# Patient Record
Sex: FEMALE | Race: WHITE | NOT HISPANIC OR LATINO
[De-identification: names, ages, dates, MRNs, and addresses within clinical notes are randomized per-mention and may not be internally consistent; named-entity substitution may affect disease eponyms.]

---

## 2020-07-14 ENCOUNTER — TRANSCRIPTION ENCOUNTER (OUTPATIENT)
Age: 40
End: 2020-07-14

## 2020-12-12 ENCOUNTER — OUTPATIENT (OUTPATIENT)
Dept: OUTPATIENT SERVICES | Facility: HOSPITAL | Age: 40
LOS: 1 days | End: 2020-12-12
Payer: COMMERCIAL

## 2020-12-12 DIAGNOSIS — Z11.59 ENCOUNTER FOR SCREENING FOR OTHER VIRAL DISEASES: ICD-10-CM

## 2020-12-12 LAB — SARS-COV-2 RNA SPEC QL NAA+PROBE: SIGNIFICANT CHANGE UP

## 2020-12-12 PROCEDURE — U0003: CPT

## 2020-12-13 DIAGNOSIS — Z11.59 ENCOUNTER FOR SCREENING FOR OTHER VIRAL DISEASES: ICD-10-CM

## 2023-02-07 DIAGNOSIS — B37.9 CANDIDIASIS, UNSPECIFIED: ICD-10-CM

## 2023-02-07 RX ORDER — FLUCONAZOLE 150 MG/1
150 TABLET ORAL
Qty: 1 | Refills: 0 | Status: ACTIVE | COMMUNITY
Start: 2023-02-07

## 2023-03-01 ENCOUNTER — NON-APPOINTMENT (OUTPATIENT)
Age: 43
End: 2023-03-01

## 2023-03-01 ENCOUNTER — APPOINTMENT (OUTPATIENT)
Dept: ORTHOPEDIC SURGERY | Facility: CLINIC | Age: 43
End: 2023-03-01
Payer: COMMERCIAL

## 2023-03-01 DIAGNOSIS — M23.91 UNSPECIFIED INTERNAL DERANGEMENT OF RIGHT KNEE: ICD-10-CM

## 2023-03-01 DIAGNOSIS — M25.561 PAIN IN RIGHT KNEE: ICD-10-CM

## 2023-03-01 DIAGNOSIS — M79.672 PAIN IN LEFT FOOT: ICD-10-CM

## 2023-03-01 PROCEDURE — 73610 X-RAY EXAM OF ANKLE: CPT | Mod: LT

## 2023-03-01 PROCEDURE — 73562 X-RAY EXAM OF KNEE 3: CPT | Mod: RT

## 2023-03-01 PROCEDURE — 73630 X-RAY EXAM OF FOOT: CPT | Mod: LT

## 2023-03-01 PROCEDURE — 99204 OFFICE O/P NEW MOD 45 MIN: CPT

## 2023-03-01 NOTE — DISCUSSION/SUMMARY
[de-identified] : Today I had a lengthy discussion with the patient regarding their residual left ankle pain s/p Achilles tendon repair, granuloma excision.I have addressed all the patient's concerns surrounding the pathology of their condition. XR imaging was completed in office today and the results were reviewed with the patient. At this time I would like to obtain advanced imaging of the patient's left ankle. An MRI was ordered so I can find out more about the etiology of the patient's condition. The patient should follow up with the office after obtaining the MRI. I recommend the patient undergo a course of physical therapy for the left ankle 2-3 times a week for a total of 6-8 weeks. A prescription was given for the physical therapy today. \par \par The patient understood and verbally agreed to the treatment plan. All of their questions were answered and they were satisfied with the visit. The patient should call the office if they have any questions or experience worsening symptoms.

## 2023-03-01 NOTE — PHYSICAL EXAM
[de-identified] : General: Alert and oriented x3. In no acute distress. Pleasant in nature with a normal affect. No apparent respiratory distress.\par \par Left Knee Exam\par Skin: Clean, dry, intact\par Inspection: No obvious malalignment, no masses, no swelling, no effusion \par Pulses: 2+ DP/PT pulses\par ROM: 0-130 degrees of flexion. No pain with deep knee flexion.\par Tenderness: No MJLT. No LJLT. No pain over the patella facets. No pain to the quadriceps mechanism.\par Stability: Stable to varus, valgus, Lachman testing. Negative anterior/posterior drawer.\par Strength: 5/5 Q/H/TA/GS/EHL, no atrophy\par Neuro: In tact to light touch throughout, DTR's normal\par Additional tests: Negative McMurrays test, Negative patellar grind test. \par \par Left Foot Exam\par Skin: Clean, dry, intact, 4 inch posterior incision with areas of fullness and thickening to the posterior Achilles. \par Inspection: No obvious malalignment, no masses, \par Pulses: 2+ DP/PT pulses\par ROM: FOOT Full ROM of digits, ANKLE 10 degrees of dorsiflexion, 40 degrees of plantarflexion, 35 degress of inversion, 35 degrees of eversion,10 degrees of subtalar motion.\par Painful ROM: None\par Tenderness: Tenderness over the repair site. No tenderness over the medial malleolus, No tenderness over the lateral malleolus, no CFL/ATFL/PTFL pain, no deltoid ligament pain. No heel pain. No Achilles tenderness. No 5th metatarsal pain. No pain to the LisFranc joint. No ttp over the posterior tibial tendon.\par Stability: Negative anterior/posterior drawer.\par Strength: 5/5 ADD/ABD/TA/GS/EHL/FHL/EDL\par Neuro: Sensation in tact to light touch throughout\par Additional tests: Negative Mortons test, negative tarsal tunnel tinels, negative single heel rise, Rosario's negative.  [de-identified] : 5V of the left ankle were ordered obtained and reviewed by me today, 03/01/2023 , revealed: stable XRs\par \par 3V of the right knee were ordered obtained and reviewed by me today, 03/01/2023 , revealed: normal alignment, normal appearing joint spaces.\par \par

## 2023-03-01 NOTE — HISTORY OF PRESENT ILLNESS
[FreeTextEntry1] : The patient is a 43 yo female presenting for an initial visit of left foot/ankle pain. She is here seeking a second opinion. The patient states that she recently has Achilles tendon repair June 2022 by Dr. Broussard. She states a few weeks after she developed cellulitis and had a granuloma excision. She states she started physical therapy around the 6 week post op. The patient endorse posterior ankle tightness and and dorsal foot pain. The patient is currently in PT for this issue. She states that she has been compensating her gait patter to which her right knee is now bothering her. She denies numbness and tingling. She presents wearing boots and is ambulating without assistance. No other complaints. Had IUD prior to the injury. NO antibiotic

## 2023-03-16 ENCOUNTER — OUTPATIENT (OUTPATIENT)
Dept: OUTPATIENT SERVICES | Facility: HOSPITAL | Age: 43
LOS: 1 days | End: 2023-03-16
Payer: COMMERCIAL

## 2023-03-16 ENCOUNTER — APPOINTMENT (OUTPATIENT)
Dept: MRI IMAGING | Facility: CLINIC | Age: 43
End: 2023-03-16
Payer: COMMERCIAL

## 2023-03-16 DIAGNOSIS — Z87.828 PERSONAL HISTORY OF OTHER (HEALED) PHYSICAL INJURY AND TRAUMA: ICD-10-CM

## 2023-03-16 DIAGNOSIS — M25.572 PAIN IN LEFT ANKLE AND JOINTS OF LEFT FOOT: ICD-10-CM

## 2023-03-16 DIAGNOSIS — M79.672 PAIN IN LEFT FOOT: ICD-10-CM

## 2023-03-16 DIAGNOSIS — Z98.890 OTHER SPECIFIED POSTPROCEDURAL STATES: ICD-10-CM

## 2023-03-16 PROCEDURE — 73721 MRI JNT OF LWR EXTRE W/O DYE: CPT

## 2023-03-16 PROCEDURE — 73721 MRI JNT OF LWR EXTRE W/O DYE: CPT | Mod: 26,LT

## 2023-03-17 ENCOUNTER — TRANSCRIPTION ENCOUNTER (OUTPATIENT)
Age: 43
End: 2023-03-17

## 2023-03-22 ENCOUNTER — APPOINTMENT (OUTPATIENT)
Dept: OBGYN | Facility: CLINIC | Age: 43
End: 2023-03-22
Payer: COMMERCIAL

## 2023-03-22 ENCOUNTER — RESULT CHARGE (OUTPATIENT)
Age: 43
End: 2023-03-22

## 2023-03-22 VITALS
WEIGHT: 144 LBS | BODY MASS INDEX: 33.33 KG/M2 | DIASTOLIC BLOOD PRESSURE: 80 MMHG | HEIGHT: 55 IN | SYSTOLIC BLOOD PRESSURE: 124 MMHG | HEART RATE: 85 BPM

## 2023-03-22 DIAGNOSIS — Z97.5 PRESENCE OF (INTRAUTERINE) CONTRACEPTIVE DEVICE: ICD-10-CM

## 2023-03-22 LAB
BILIRUB UR QL STRIP: NORMAL
CLARITY UR: CLEAR
COLLECTION METHOD: NORMAL
GLUCOSE UR-MCNC: NORMAL
HCG UR QL: 0.2 EU/DL
HEMOGLOBIN: 12.6
HGB UR QL STRIP.AUTO: NORMAL
KETONES UR-MCNC: NORMAL
LEUKOCYTE ESTERASE UR QL STRIP: NORMAL
NITRITE UR QL STRIP: NORMAL
PH UR STRIP: 7
PROT UR STRIP-MCNC: NORMAL
SP GR UR STRIP: 1.01

## 2023-03-22 PROCEDURE — 82270 OCCULT BLOOD FECES: CPT

## 2023-03-22 PROCEDURE — 99396 PREV VISIT EST AGE 40-64: CPT

## 2023-03-22 PROCEDURE — 85018 HEMOGLOBIN: CPT | Mod: QW

## 2023-03-22 PROCEDURE — 81003 URINALYSIS AUTO W/O SCOPE: CPT | Mod: QW

## 2023-03-22 PROCEDURE — 99386 PREV VISIT NEW AGE 40-64: CPT

## 2023-03-23 NOTE — HISTORY OF PRESENT ILLNESS
[TextBox_4] : Patient is a 41 yo female here today for annual visit. She had a mirena IUD inserted 1/2022. She is tolerating well. Periods are very light\par she has hx of rectal bleeding from an internal hemorrhoid in the past and had a colonoscopy years ago.\par grandmother hx of esophageal cancer her PMD recommended another colonoscopy at this time \par she recently ruptured her Achilles and had surgery

## 2023-03-23 NOTE — PLAN
Frequent urination [FreeTextEntry1] : \par \par Patient to follow up in 1 year for annual GYN exam\par Mammogram and bilateral breast US due: now Rx given \par Colonoscopy due: now \par Bone density due:   pm \par \par \par \par Pap ordered\par Hemoccult ordered \par All questions answered, patient agreeable with plan.\par \par \par \par I Jolene Luevano FNLUCINDA-C am scribing for the presence of Dr. Lockwood the following sections HISTORY OF PRESENT ILLNESS, PAST MEDICAL/FAMILY/SOCIAL HISTORY; REVIEW OF SYSTEMS; VITAL SIGNS; PHYSICAL EXAM; DISPOSITION. \par I personally performed the services described in the documentation, reviewed the documentation recorded by the scribe in my presence and it accurately and completely records my words and actions.\par

## 2023-03-23 NOTE — PHYSICAL EXAM
[Chaperone Present] : A chaperone was present in the examining room during all aspects of the physical examination [Appropriately responsive] : appropriately responsive [Alert] : alert [No Acute Distress] : no acute distress [No Lymphadenopathy] : no lymphadenopathy [Soft] : soft [Non-tender] : non-tender [Non-distended] : non-distended [No HSM] : No HSM [No Lesions] : no lesions [No Mass] : no mass [Oriented x3] : oriented x3 [Examination Of The Breasts] : a normal appearance [No Discharge] : no discharge [No Masses] : no breast masses were palpable [Labia Majora] : normal [Labia Minora] : normal [IUD String] : an IUD string was noted [Normal] : normal [Uterine Adnexae] : normal [Normal rectal exam] : was normal [FreeTextEntry1] : Jolene PETTY-MAHIN chaperoned during entire physical exam [Occult Blood Positive] : was negative for occult blood analysis

## 2023-03-24 ENCOUNTER — APPOINTMENT (OUTPATIENT)
Dept: ORTHOPEDIC SURGERY | Facility: CLINIC | Age: 43
End: 2023-03-24
Payer: COMMERCIAL

## 2023-03-24 PROCEDURE — 99214 OFFICE O/P EST MOD 30 MIN: CPT

## 2023-03-24 RX ORDER — MELOXICAM 15 MG/1
15 TABLET ORAL
Qty: 14 | Refills: 1 | Status: ACTIVE | COMMUNITY
Start: 2023-03-24 | End: 1900-01-01

## 2023-03-24 RX ORDER — DICLOFENAC SODIUM 1% 10 MG/G
1 GEL TOPICAL
Qty: 100 | Refills: 2 | Status: ACTIVE | COMMUNITY
Start: 2023-03-24 | End: 1900-01-01

## 2023-03-24 NOTE — ADDENDUM
[FreeTextEntry1] : I, LATRICE HOLLEY, acted solely as a scribe for Dr. Jhonny Tamayo on this date 03/24/2023  .\par  \par All medical record entries made by the Scribe were at my, Dr. Jhonny Tamayo, direction and personally dictated by me on 03/24/2023 . I have reviewed the chart and agree that the record accurately reflects my personal performance of the history, physical exam, assessment and plan. I have also personally directed, reviewed, and agreed with the chart.

## 2023-03-24 NOTE — DISCUSSION/SUMMARY
[de-identified] : Today I had a lengthy discussion with the patient regarding their residual left ankle pain s/p Achilles tendon repair, granuloma excision. I have addressed all the patient's concerns surrounding the pathology of their condition.  MRI imaging was reviewed in detail with patient. Discussed with the patient conservative and surgical treatment options. Discussed with the patient that we will continue with conservative treatment. I recommend the patient to continue with physical therapy for the left ankle  2-3 times a week for a total of 6-8 weeks. I recommend that the patient utilize Voltaren gel topically.  A prescription for the Voltaren gel was ordered for the patient today. If the Voltaren gel could not be obtained, Icy Hot, Biofreeze, or Bengay can be utilized instead. I recommend that the patient utilize meloxicam with food once per day as instructed. A prescription for the meloxicam was ordered for the patient in the office today. I recommend that the patient utilize ice and heat. They can also elevate their left ankle above the level of the heart. \par \par Patient advised to follow up for further evaluation in 6 - 8 weeks.\par \par The patient understood and verbally agreed to the treatment plan. All of their questions were answered and they were satisfied with the visit. \par The patient should call the office if they have any questions or experience worsening symptoms.\par

## 2023-03-24 NOTE — HISTORY OF PRESENT ILLNESS
[FreeTextEntry1] : 3/24/23: The patient is here for a follow-up of her left ankle and to review the MRI of her left ankle.  No change since the previous office visit. Patient presents in sneakers. Patient states she is currently undergoing Physical therapy for left ankle.\par \par 3/1/23: The patient is a 41 yo female presenting for an initial visit of left foot/ankle pain. She is here seeking a second opinion. The patient states that she recently has Achilles tendon repair June 2022 by Dr. Broussard. She states a few weeks after she developed cellulitis and had a granuloma excision. She states she started physical therapy around the 6 week post op. The patient endorse posterior ankle tightness and and dorsal foot pain. The patient is currently in PT for this issue. She states that she has been compensating her gait patter to which her right knee is now bothering her. She denies numbness and tingling. She presents wearing boots and is ambulating without assistance. No other complaints. Had IUD prior to the injury. NO antibiotic

## 2023-03-24 NOTE — PHYSICAL EXAM
[de-identified] : General: Alert and oriented x3. In no acute distress. Pleasant in nature with a normal affect. No apparent respiratory distress.\par \par Left Knee Exam\par Skin: Clean, dry, intact\par Inspection: No obvious malalignment, no masses, no swelling, no effusion \par Pulses: 2+ DP/PT pulses\par ROM: 0-130 degrees of flexion. No pain with deep knee flexion.\par Tenderness: No MJLT. No LJLT. No pain over the patella facets. No pain to the quadriceps mechanism.\par Stability: Stable to varus, valgus, Lachman testing. Negative anterior/posterior drawer.\par Strength: 5/5 Q/H/TA/GS/EHL, no atrophy\par Neuro: In tact to light touch throughout, DTR's normal\par Additional tests: Negative McMurrays test, Negative patellar grind test. \par \par Left Foot Exam\par Skin: Clean, dry, intact, 4 inch posterior incision with areas of fullness and thickening to the posterior Achilles. \par Inspection: No obvious malalignment, no masses, \par Pulses: 2+ DP/PT pulses\par ROM: FOOT Full ROM of digits, ANKLE 10 degrees of dorsiflexion, 40 degrees of plantarflexion, 35 degress of inversion, 35 degrees of eversion,10 degrees of subtalar motion.\par Painful ROM: None\par Tenderness: Tenderness over the repair site. No tenderness over the medial malleolus, No tenderness over the lateral malleolus, no CFL/ATFL/PTFL pain, no deltoid ligament pain. No heel pain. No Achilles tenderness. No 5th metatarsal pain. No pain to the LisFranc joint. No ttp over the posterior tibial tendon.\par Stability: Negative anterior/posterior drawer.\par Strength: 5/5 ADD/ABD/TA/GS/EHL/FHL/EDL\par Neuro: Sensation in tact to light touch throughout\par Additional tests: Negative Mortons test, negative tarsal tunnel tinels, negative single heel rise, Rosario's negative.  [de-identified] : EXAM: [id number] - MR ANKLE LT  - ORDERED BY:  OLVIN REYES\par \par \par PROCEDURE DATE:  03/16/2023\par \par \par \par INTERPRETATION:  LEFT ANKLE MRI\par \par CLINICAL INDICATION: Persistent ankle pain, status post Achilles tendon repair surgery in June 2022.\par \par COMPARISON: None available.\par \par TECHNIQUE: Multiplanar, multisequence MRI was obtained of the left ankle.\par \par FINDINGS:\par \par LIGAMENTS: Small chronic grade 2 sprain and healed minimally displaced cortical avulsion stress fracture at the fibular origin of the anterior talofibular ligament, but with intact fibers throughout. Calcaneofibular ligament is intact. . Grade 1 subacute on chronic sprain and fraying of the posterior inferior tibiofibular ligament without discrete tear. Chronic grade 2 sprains of the inferior tibiofibular ligaments without syndesmotic widening. Small grade 2 subacute on grade 2 chronic sprain of the deltoid ligamentous complex including more recent partial-thickness tear of the deep posterior tibiotalar band. 1 subacute on chronic sprain of the calcaneonavicular spring ligament, but with intact fibers throughout. Small grade 2 sprains of the bifurcated and short calcaneocuboid ligaments.\par \par DEEP POSTERIOR COMPARTMENT: Small subacute grade 2 sprain of the deep/lateral slip of the posterior tibialis tendon at the level of the accessory navicular (series 8, image 16; series 4, image 27). Grade 1 subacute on grade 2 chronic strain of the superficial/medial slip of the posterior tibialis tendon at the level of the naviculocuneiform joint (series 9, image 13). Subcutaneous grade 1 strain at the myotendinous junction of the flexor hallucis longus. Intact flexor digitorum longus.\par \par ACHILLES TENDON: Status post remote repair surgery with severe tendinosis and mild to moderate peritendinitis between the myotendinous junction of the soleus and avascular zone. Residual postsurgical changes versus small recurrent low-grade partial-thickness interstitial tear of the medial third the level of the avascular zone in its calcaneal insertion. Trace reactive fluid within the retrocalcaneal bursa without cesar bursitis. Subacute grade 1 strain at the myotendinous junction of the soleus.\par \par LATERAL COMPARTMENT: Peroneal tendons are intact.\par \par ANTERIOR COMPARTMENT: Tibialis anterior and extensor tendons are intact.\par \par OSSEOUS: No acute fracture, osteonecrosis, or aggressive neoplasm. No osteochondral lesion of the talar dome.\par \par SYNOVIUM: Small volume residual reactive tibiotalar and hindfoot effusions.\par \par GENERAL: No significant acute plantar fasciitis. No significant loss of normal fat signal within the sinus tarsus. Normal appearance of the neurovascular bundle within the tarsal tunnel.\par \par IMPRESSION:\par \par 1.  Status post left Achilles repair surgery without high-grade residual or recurrent re-tear. Mild to moderate postsurgical tendinitis with low-grade residual versus small recurrent partial-thickness interstitial tear of the medial third between the avascular zone and calcaneal insertion.\par 2.  Grade 2 subacute on grade 2 chronic multi ligamentous sprain injury of left ankle and hindfoot including small more recent partial-thickness tears of the deltoid, bifurcate, and short plantar calcaneocuboid ligaments. Small residual reactive tibiotalar and hindfoot effusions. No recent fracture.\par 3.  Grade 2 subacute on grade 2 chronic strain of left posterior tibialis tendon including small more recent partial-thickness tear of the deep/lateral slip at the level of the accessory navicular. Grade 1 subacute on grade 2 chronic sprain of adjacent calcaneonavicular spring ligament.\par \par --- End of Report ---\par \par \par \par \par \par \par ORQUIDEA YAN MD; Attending Radiologist\par This document has been electronically signed. Mar 22 2023  9:03AM\par \par  \par \par  Ordered by: OLVIN REYES       Collected/Examined: 16Mar2023 12:51PM       \par Verified by: OLVIN REYES 22Mar2023 12:31PM       \par  Result Communication: No patient communication needed at this time;\par Stage: Final       \par  Performed at: [facility]       Resulted: 22Mar2023 08:47AM       Last Updated: 22Mar2023 12:31PM       Accession: [id number]

## 2023-03-28 LAB — CYTOLOGY CVX/VAG DOC THIN PREP: NORMAL

## 2023-04-25 ENCOUNTER — APPOINTMENT (OUTPATIENT)
Dept: ORTHOPEDIC SURGERY | Facility: CLINIC | Age: 43
End: 2023-04-25
Payer: COMMERCIAL

## 2023-04-25 PROCEDURE — 99214 OFFICE O/P EST MOD 30 MIN: CPT

## 2023-04-25 PROCEDURE — 73564 X-RAY EXAM KNEE 4 OR MORE: CPT | Mod: RT

## 2023-04-25 NOTE — PHYSICAL EXAM
[de-identified] :  Multi-System Physical Exam\par \par General: Well appearing, no acute distress \par Neurologic: A&Ox3, No focal deficits \par Head: NCAT without abrasions, lacerations, or ecchymosis to head, face, or scalp \par Eyes: No scleral icterus, no gross abnormalities \par Respiratory: Equal chest wall expansion bilaterally, no accessory muscle use \par Lymphatic: No lymphadenopathy palpated \par Skin: Warm and dry \par Psychiatric: Normal mood and affect\par \par Right Knee: \par Range of Motion in Degrees   \par Flexion Active  135-degrees \par Flexion Passive  135-degrees \par Extension Active  0-5-degrees \par Extension Passive  0-5-degrees   \par \par No weakness to flexion/extension. Crepitus noted. No evidence of instability in the AP plane or varus or valgus stress. positive Grind. Negative Lachman. Negative pivot shift. Negative anterior drawer test. Negative posterior drawer test. Negative Gretchen. Negative Apley grind. negative Cipriano test. + medial joint line tenderness at 90. No lateral jointline tenderness. POS synovial cyst to popliteal fossa. No tenderness over the medial and lateral facet of the patella. No patellofemoral crepitations. + lateral tracking patella. No patellar apprehension. No crepitation in the medial and lateral femoral condyle. No proximal or distal swelling, edema or tenderness. No gross motor or sensory deficits. No intra-articular swelling. 2+ DP and PT pulses. No varus or valgus malalignment. Skin is intact. No rashes, scars or lesions. Quad strength normal. Motor exam 5/5 distally, EHL/FHL/GSC/TA   \par \par Left Knee: \par Range of Motion in Degrees   \par Flexion Active  135-degrees \par Flexion Passive  135-degrees \par Extension Active  0-5-degrees \par Extension Passive  0-5-degrees   \par \par No weakness to flexion/extension. No evidence of instability in the AP plane or varus or valgus stress. Negative Lachman. Negative pivot shift. Negative anterior drawer test. Negative posterior drawer test. Negative Gretchen. Negative Apley grind. negative Cipriano test. No medial or lateral joint line tenderness. No tenderness over the medial and lateral facet of the patella. No patellofemoral crepitations. No lateral tilting patella. No patellar apprehension. No crepitation in the medial and lateral femoral condyle. No proximal or distal swelling, edema or tenderness. No gross motor or sensory deficits. No intra-articular swelling. 2+ DP and PT pulses. No varus or valgus malalignment. Skin is intact. No rashes, scars or lesions. Quad strength normal. Motor exam 5/5 distally, EHL/FHL/GSC/TA  [de-identified] : 4 views of R knee were performed today and available for me to review. Results were discussed with the patient. They demonstrate no f/x, dislocation or other deformity.\par

## 2023-04-25 NOTE — HISTORY OF PRESENT ILLNESS
[Worsening] : worsening [___ mths] : [unfilled] month(s) ago [3] : an average pain level of 3/10 [2] : a minimum pain level of 2/10 [4] : a maximum pain level of 4/10 [Bending] : worsened by bending [Walking] : worsened by walking [de-identified] : DAYLIN RAPP is a 42 year female being seen for initial visit R knee pain. She has a prior h/x of a L achilles tendon repair in June 2022 by Dr. Broussard with some wound complications. She reports she has been experiencing right knee pain since recovering from that surgery. She endorses pain over the patellofemoral joint. She describes the pain as achy and dull. She reports most pain with stairs, long periods of bending and when participating in exercise classes. She denies swelling of her knee. Patient denies mechanical symptoms such as clicking and popping, as well as instances of instability. Patient denies numbness and tingling to the extremities. She reports no prior injuries to her R knee. She has been using ice with relief.  She has been performing PT for the last couple of months without improvement of her knee pain. She was referred by Dr. Tamayo.

## 2023-04-25 NOTE — ADDENDUM
[FreeTextEntry1] : Documented by Oanh Flanagan acting as a scribe for Dr. Diaz and Armando Reed PA-C on 04/25/2023.   All medical record entries made by the Scribe were at my, Dr. Diaz, and Armando Reed's, direction and personally dictated by me on 04/25/2023. I have reviewed the chart and agree that the record accurately reflects my personal performance of the history, physical exam, procedure and imaging.

## 2023-04-25 NOTE — DISCUSSION/SUMMARY
[de-identified] : We had a thorough discussion regarding the nature of her pain, the pathophysiology, as well as all treatment options. 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 Patient was given prescription of formal physical therapy that she will perform 2x/wk for 6-8 wks. Considering the patient's current presentation of pain, physical exam, and radiographs an MRI is indicated at this time. A prescription for this was given to the patient. We will go over the MRI results with her upon obtaining the results in the office and advise her of further treatment options. She agrees with the above plan and all questions were answered.

## 2023-05-12 ENCOUNTER — APPOINTMENT (OUTPATIENT)
Dept: MRI IMAGING | Facility: CLINIC | Age: 43
End: 2023-05-12
Payer: COMMERCIAL

## 2023-05-12 ENCOUNTER — OUTPATIENT (OUTPATIENT)
Dept: OUTPATIENT SERVICES | Facility: HOSPITAL | Age: 43
LOS: 1 days | End: 2023-05-12
Payer: COMMERCIAL

## 2023-05-12 DIAGNOSIS — M22.41 CHONDROMALACIA PATELLAE, RIGHT KNEE: ICD-10-CM

## 2023-05-12 PROCEDURE — 73721 MRI JNT OF LWR EXTRE W/O DYE: CPT | Mod: 26,RT

## 2023-05-12 PROCEDURE — 73721 MRI JNT OF LWR EXTRE W/O DYE: CPT

## 2023-05-30 ENCOUNTER — APPOINTMENT (OUTPATIENT)
Dept: ORTHOPEDIC SURGERY | Facility: CLINIC | Age: 43
End: 2023-05-30
Payer: COMMERCIAL

## 2023-05-30 PROCEDURE — 99442: CPT

## 2023-07-20 ENCOUNTER — APPOINTMENT (OUTPATIENT)
Dept: ORTHOPEDIC SURGERY | Facility: CLINIC | Age: 43
End: 2023-07-20
Payer: COMMERCIAL

## 2023-07-20 PROCEDURE — 20605 DRAIN/INJ JOINT/BURSA W/O US: CPT | Mod: LT

## 2023-07-20 PROCEDURE — 99213 OFFICE O/P EST LOW 20 MIN: CPT | Mod: 25

## 2023-07-20 NOTE — HISTORY OF PRESENT ILLNESS
[FreeTextEntry1] : 7/20/23:  The patient is here for follow-up of her left ankle. The patient is having central ankle pain.  Patient also having Achilles tendon pain but she is seeing a dermatologist who was injecting her Achilles tendon with cortisone injections.  Patient is here for her central ankle pain.  No other complaints.  No trauma associated with her pain.  Patient remains active.\par \par 3/24/23: The patient is here for a follow-up of her left ankle and to review the MRI of her left ankle.  No change since the previous office visit. Patient presents in [facility]. Patient states she is currently undergoing Physical therapy for left ankle.\par \par 3/1/23: The patient is a 43 yo female presenting for an initial visit of left foot/ankle pain. She is here seeking a second opinion. The patient states that she recently has Achilles tendon repair June 2022 by Dr. Broussard. She states a few weeks after she developed cellulitis and had a granuloma excision. She states she started physical therapy around the 6 week post op. The patient endorse posterior ankle tightness and and dorsal foot pain. The patient is currently in PT for this issue. She states that she has been compensating her gait patter to which her right knee is now bothering her. She denies numbness and tingling. She presents wearing boots and is ambulating without assistance. No other complaints. Had IUD prior to the injury. NO antibiotic

## 2023-07-20 NOTE — PROCEDURE
[FreeTextEntry1] : Laterality: Left ankle Tibiotalar Joint Injection (cortisone)\par \par The risks and benefits of the injection were reviewed with the patient today in office and all their questions were answered.  The injection site was the left ankle Tibiotalar Joint.  Prior to giving the injection the injection site was prepped with Chloraprep and a sterile field was created.  Sterile technique was carried out throughout the procedure.  After verbal consent from the patient we went ahead and injected the left ankle with 2 ml 40 mg Depo-Medrol, 3 ml 1% lidocaine and 2 ml of .50% Bupivacaine for a total of 6 ml with a 25 tye 1.5" needle.  The medication was placed into the joint without complication.  Post injection the patient reported no pain, had a normal gait and good motion of the ankle.  The patient denied numbness and tingling in their foot.  There were no complications during the procedure.

## 2023-07-20 NOTE — PHYSICAL EXAM
[de-identified] : Left ankle Physical Examination:\par \par General: Alert and oriented x3.  In no acute distress.  Pleasant in nature with a normal affect.  No apparent respiratory distress. \par Erythema, Warmth, Rubor: Negative\par Swelling: Negative\par \par ROM:\par 1. Dorsiflexion: 10 degrees\par 2. Plantarflexion: 40 degrees\par 3. Inversion: 30 degrees\par 4. Eversion: 20 degrees\par \par Tenderness to Palpation: \par 1. Lateral Malleolus: Negative\par 2. Medial Malleolus: Negative\par 3. Proximal Fibular Pain: Negative\par 4. Heel Pain: Negative\par 5. Cuboid: Negative\par 6. Navicular: Negative\par 7. Tibiotalar Joint: Positive pain central ankle tibiotalar joint.\par 8. Subtalar Joint: Negative\par 9. Posterior Recess: Negative\par \par Tendon Pain:\par 1. Achilles: Positive pain and tenderness to palpation of the Achilles tendon with thickening noted.\par 2. Peroneals: Negative\par 3. Posterior Tibialis: Negative\par 4. Tibialis Anterior: Negative\par \par Ligament Pain:\par 1. ATFL: Negative\par 2. CFL: Negative \par 3. PTFL: Negative\par 4. Deltoid Ligaments: Negative\par 5. Lis Franc Ligament: Negative\par \par Stability: \par 1. Anterior Drawer: Negative\par 2. Posterior Drawer: Negative\par \par Strength: 5/5 TA/GS/EHL\par \par Pulses: 2+ DP/PT Pulses\par \par Neuro: Intact motor and sensory\par \par Additional Test:\par 1. Calcaneal Squeeze Test: Negative\par 2. Syndesmosis Squeeze Test: Negative [de-identified] : EXAM: [id number] - MR ANKLE LT  - ORDERED BY:  OLVIN REYES\par \par \par PROCEDURE DATE:  03/16/2023\par \par \par \par INTERPRETATION:  LEFT ANKLE MRI\par \par CLINICAL INDICATION: Persistent ankle pain, status post Achilles tendon repair surgery in June 2022.\par \par COMPARISON: None available.\par \par TECHNIQUE: Multiplanar, multisequence MRI was obtained of the left ankle.\par \par FINDINGS:\par \par LIGAMENTS: Small chronic grade 2 sprain and healed minimally displaced cortical avulsion stress fracture at the fibular origin of the anterior talofibular ligament, but with intact fibers throughout. Calcaneofibular ligament is intact. . Grade 1 subacute on chronic sprain and fraying of the posterior inferior tibiofibular ligament without discrete tear. Chronic grade 2 sprains of the inferior tibiofibular ligaments without syndesmotic widening. Small grade 2 subacute on grade 2 chronic sprain of the deltoid ligamentous complex including more recent partial-thickness tear of the deep posterior tibiotalar band. 1 subacute on chronic sprain of the calcaneonavicular spring ligament, but with intact fibers throughout. Small grade 2 sprains of the bifurcated and short calcaneocuboid ligaments.\par \par DEEP POSTERIOR COMPARTMENT: Small subacute grade 2 sprain of the deep/lateral slip of the posterior tibialis tendon at the level of the accessory navicular (series 8, image 16; series 4, image 27). Grade 1 subacute on grade 2 chronic strain of the superficial/medial slip of the posterior tibialis tendon at the level of the naviculocuneiform joint (series 9, image 13). Subcutaneous grade 1 strain at the myotendinous junction of the flexor hallucis longus. Intact flexor digitorum longus.\par \par ACHILLES TENDON: Status post remote repair surgery with severe tendinosis and mild to moderate peritendinitis between the myotendinous junction of the soleus and avascular zone. Residual postsurgical changes versus small recurrent low-grade partial-thickness interstitial tear of the medial third the level of the avascular zone in its calcaneal insertion. Trace reactive fluid within the retrocalcaneal bursa without cesar bursitis. Subacute grade 1 strain at the myotendinous junction of the soleus.\par \par LATERAL COMPARTMENT: Peroneal tendons are intact.\par \par ANTERIOR COMPARTMENT: Tibialis anterior and extensor tendons are intact.\par \par OSSEOUS: No acute fracture, osteonecrosis, or aggressive neoplasm. No osteochondral lesion of the talar dome.\par \par SYNOVIUM: Small volume residual reactive tibiotalar and hindfoot effusions.\par \par GENERAL: No significant acute plantar fasciitis. No significant loss of normal fat signal within the sinus tarsus. Normal appearance of the neurovascular bundle within the tarsal tunnel.\par \par IMPRESSION:\par \par 1.  Status post left Achilles repair surgery without high-grade residual or recurrent re-tear. Mild to moderate postsurgical tendinitis with low-grade residual versus small recurrent partial-thickness interstitial tear of the medial third between the avascular zone and calcaneal insertion.\par 2.  Grade 2 subacute on grade 2 chronic multi ligamentous sprain injury of left ankle and hindfoot including small more recent partial-thickness tears of the deltoid, bifurcate, and short plantar calcaneocuboid ligaments. Small residual reactive tibiotalar and hindfoot effusions. No recent fracture.\par 3.  Grade 2 subacute on grade 2 chronic strain of left posterior tibialis tendon including small more recent partial-thickness tear of the deep/lateral slip at the level of the accessory navicular. Grade 1 subacute on grade 2 chronic sprain of adjacent calcaneonavicular spring ligament.\par \par --- End of Report ---\par \par \par \par \par \par \par ORQUIDEA YAN MD; Attending Radiologist\par This document has been electronically signed. Mar 22 2023  9:03AM\par \par  \par \par  Ordered by: OLVIN REYES       Collected/Examined: 16Mar2023 12:51PM       \par Verified by: OLVIN REYES 22Mar2023 12:31PM       \par  Result Communication: No patient communication needed at this time;\par Stage: Final       \par  Performed at: [facility] [place]       Resulted: 22Mar2023 08:47AM       Last Updated: 22Mar2023 12:31PM       Accession: [id number]

## 2023-07-20 NOTE — DISCUSSION/SUMMARY
[de-identified] : Assessment: Left ankle pain, chronic.\par \par Plan:\par 1. RICE Therapy.\par 2. Antiinflammatories/Tylenol as needed for ankle of discomfort. \par 3. The patient was advised to rest the ankle for 24-48 hours post injection.  The patient is able to resume normal activities in 24-48 hours post injection if the patient is experiencing minimal to no pain. \par 4. Continue with a home exercise/stretching program. \par 5. All the patients questions were answered.  If the patient is experiencing any problems or has concerns they were advised to call the office or make an appointment to come in to be evaluated.  Follow-up in 6 to 8 weeks if the pain does not subside post injection.  Next office visit if pain does not subside with Dr. Tamayo.\par \par \par The patient understood and verbally agreed to the treatment plan. All of their questions were answered and they were satisfied with the visit. \par The patient should call the office if they have any questions or experience worsening symptoms.\par

## 2023-07-21 ENCOUNTER — NON-APPOINTMENT (OUTPATIENT)
Age: 43
End: 2023-07-21

## 2023-09-18 ENCOUNTER — APPOINTMENT (OUTPATIENT)
Dept: ORTHOPEDIC SURGERY | Facility: CLINIC | Age: 43
End: 2023-09-18
Payer: COMMERCIAL

## 2023-09-18 PROCEDURE — 99214 OFFICE O/P EST MOD 30 MIN: CPT

## 2023-10-02 ENCOUNTER — APPOINTMENT (OUTPATIENT)
Dept: MRI IMAGING | Facility: CLINIC | Age: 43
End: 2023-10-02

## 2024-01-11 ENCOUNTER — APPOINTMENT (OUTPATIENT)
Dept: ORTHOPEDIC SURGERY | Facility: CLINIC | Age: 44
End: 2024-01-11
Payer: COMMERCIAL

## 2024-01-11 DIAGNOSIS — M67.88 OTHER SPECIFIED DISORDERS OF SYNOVIUM AND TENDON, OTHER SITE: ICD-10-CM

## 2024-01-11 DIAGNOSIS — Z87.828 PERSONAL HISTORY OF OTHER (HEALED) PHYSICAL INJURY AND TRAUMA: ICD-10-CM

## 2024-01-11 DIAGNOSIS — S93.402A SPRAIN OF UNSPECIFIED LIGAMENT OF LEFT ANKLE, INITIAL ENCOUNTER: ICD-10-CM

## 2024-01-11 DIAGNOSIS — M76.62 ACHILLES TENDINITIS, LEFT LEG: ICD-10-CM

## 2024-01-11 DIAGNOSIS — Z98.890 OTHER SPECIFIED POSTPROCEDURAL STATES: ICD-10-CM

## 2024-01-11 DIAGNOSIS — M25.572 PAIN IN LEFT ANKLE AND JOINTS OF LEFT FOOT: ICD-10-CM

## 2024-01-11 PROCEDURE — 99214 OFFICE O/P EST MOD 30 MIN: CPT

## 2024-01-11 NOTE — ADDENDUM
[FreeTextEntry1] : I, LATRICE HOLLEY, acted solely as a scribe for Dr. Jhonny Tamayo on this date 09/18/2023  .   All medical record entries made by the Scribe were at my, Dr. Jhonny Tamayo, direction and personally dictated by me on 09/18/2023 . I have reviewed the chart and agree that the record accurately reflects my personal performance of the history, physical exam, assessment and plan. I have also personally directed, reviewed, and agreed with the chart.

## 2024-01-11 NOTE — DISCUSSION/SUMMARY
[de-identified] :  Today I had a lengthy discussion with the patient regarding their left ankle pain.I have addressed all the patient's concerns surrounding the pathology of their condition.  A lengthy discussion was had about the surgery. All risks, benefits and alternatives to the recommended surgical procedure were discussed which include but are not limited to bleeding, infection, nerve damage, vascular damage, failure of the wound to heal, the need for further surgery, loss of limb, DVT, PE, loss of life as well as the risks associated with general anesthesia. The patient verbalized understanding.  I recommended to the patient a lidocaine injection in her left ankle to assist in easing the chronic pain that she is experiencing.  I recommend that the patient utilize ice, NSAIDS PRN, and heat. They can also elevate their left ankle above the level of the heart.  The patient understood and verbally agreed to the treatment plan. All of their questions were answered and they were satisfied with the visit. The patient should call the office if they have any questions or experience worsening symptoms.

## 2024-01-11 NOTE — PHYSICAL EXAM
[Normal] : Oriented to person, place, and time, insight and judgement were intact and the affect was normal [de-identified] : Left ankle Physical Examination:  General: Alert and oriented x3.  In no acute distress.  Pleasant in nature with a normal affect.  No apparent respiratory distress.  Erythema, Warmth, Rubor: Negative Swelling: Negative  ROM: 1. Dorsiflexion: 10 degrees 2. Plantarflexion: 40 degrees 3. Inversion: 30 degrees 4. Eversion: 20 degrees  Tenderness to Palpation:  1. Lateral Malleolus: Negative 2. Medial Malleolus: Negative 3. Proximal Fibular Pain: Negative 4. Heel Pain: Negative 5. Cuboid: Negative 6. Navicular: Negative 7. Tibiotalar Joint: Positive pain central ankle tibiotalar joint. 8. Subtalar Joint: Negative 9. Posterior Recess: Negative  Tendon Pain: 1. Achilles: Positive pain and tenderness to palpation of the Achilles tendon with thickening noted. 2. Peroneals: Negative 3. Posterior Tibialis: Negative 4. Tibialis Anterior: Negative  Ligament Pain: 1. ATFL: Negative 2. CFL: Negative  3. PTFL: Negative 4. Deltoid Ligaments: Negative 5. Lis Franc Ligament: Negative  Stability:  1. Anterior Drawer: Negative 2. Posterior Drawer: Negative  Strength: 5/5 TA/GS/EHL  Pulses: 2+ DP/PT Pulses  Neuro: Intact motor and sensory  Additional Test: 1. Calcaneal Squeeze Test: Negative 2. Syndesmosis Squeeze Test: Negative [de-identified] : EXAM: [id number] - MR ANKLE LT  - ORDERED BY:  OLVIN REYES\par  \par  \par  PROCEDURE DATE:  03/16/2023\par  \par  \par  \par  INTERPRETATION:  LEFT ANKLE MRI\par  \par  CLINICAL INDICATION: Persistent ankle pain, status post Achilles tendon repair surgery in June 2022.\par  \par  COMPARISON: None available.\par  \par  TECHNIQUE: Multiplanar, multisequence MRI was obtained of the left ankle.\par  \par  FINDINGS:\par  \par  LIGAMENTS: Small chronic grade 2 sprain and healed minimally displaced cortical avulsion stress fracture at the fibular origin of the anterior talofibular ligament, but with intact fibers throughout. Calcaneofibular ligament is intact. . Grade 1 subacute on chronic sprain and fraying of the posterior inferior tibiofibular ligament without discrete tear. Chronic grade 2 sprains of the inferior tibiofibular ligaments without syndesmotic widening. Small grade 2 subacute on grade 2 chronic sprain of the deltoid ligamentous complex including more recent partial-thickness tear of the deep posterior tibiotalar band. 1 subacute on chronic sprain of the calcaneonavicular spring ligament, but with intact fibers throughout. Small grade 2 sprains of the bifurcated and short calcaneocuboid ligaments.\par  \par  DEEP POSTERIOR COMPARTMENT: Small subacute grade 2 sprain of the deep/lateral slip of the posterior tibialis tendon at the level of the accessory navicular (series 8, image 16; series 4, image 27). Grade 1 subacute on grade 2 chronic strain of the superficial/medial slip of the posterior tibialis tendon at the level of the naviculocuneiform joint (series 9, image 13). Subcutaneous grade 1 strain at the myotendinous junction of the flexor hallucis longus. Intact flexor digitorum longus.\par  \par  ACHILLES TENDON: Status post remote repair surgery with severe tendinosis and mild to moderate peritendinitis between the myotendinous junction of the soleus and avascular zone. Residual postsurgical changes versus small recurrent low-grade partial-thickness interstitial tear of the medial third the level of the avascular zone in its calcaneal insertion. Trace reactive fluid within the retrocalcaneal bursa without cesar bursitis. Subacute grade 1 strain at the myotendinous junction of the soleus.\par  \par  LATERAL COMPARTMENT: Peroneal tendons are intact.\par  \par  ANTERIOR COMPARTMENT: Tibialis anterior and extensor tendons are intact.\par  \par  OSSEOUS: No acute fracture, osteonecrosis, or aggressive neoplasm. No osteochondral lesion of the talar dome.\par  \par  SYNOVIUM: Small volume residual reactive tibiotalar and hindfoot effusions.\par  \par  GENERAL: No significant acute plantar fasciitis. No significant loss of normal fat signal within the sinus tarsus. Normal appearance of the neurovascular bundle within the tarsal tunnel.\par  \par  IMPRESSION:\par  \par  1.  Status post left Achilles repair surgery without high-grade residual or recurrent re-tear. Mild to moderate postsurgical tendinitis with low-grade residual versus small recurrent partial-thickness interstitial tear of the medial third between the avascular zone and calcaneal insertion.\par  2.  Grade 2 subacute on grade 2 chronic multi ligamentous sprain injury of left ankle and hindfoot including small more recent partial-thickness tears of the deltoid, bifurcate, and short plantar calcaneocuboid ligaments. Small residual reactive tibiotalar and hindfoot effusions. No recent fracture.\par  3.  Grade 2 subacute on grade 2 chronic strain of left posterior tibialis tendon including small more recent partial-thickness tear of the deep/lateral slip at the level of the accessory navicular. Grade 1 subacute on grade 2 chronic sprain of adjacent calcaneonavicular spring ligament.\par  \par  --- End of Report ---\par  \par  \par  \par  \par  \par  \par  ORQUIDEA YAN MD; Attending Radiologist\par  This document has been electronically signed. Mar 22 2023  9:03AM\par  \par   \par  \par   Ordered by: OLVIN REYES       Collected/Examined: 16Mar2023 12:51PM       \par  Verified by: OLVIN REYES 22Mar2023 12:31PM       \par   Result Communication: No patient communication needed at this time;\par  Stage: Final       \par   Performed at: [facility] [place]       Resulted: 22Mar2023 08:47AM       Last Updated: 22Mar2023 12:31PM       Accession: [id number]

## 2024-01-11 NOTE — PHYSICAL EXAM
[Normal] : Oriented to person, place, and time, insight and judgement were intact and the affect was normal [de-identified] : Left ankle Physical Examination:  General: Alert and oriented x3.  In no acute distress.  Pleasant in nature with a normal affect.  No apparent respiratory distress.  Erythema, Warmth, Rubor: Negative Swelling: Negative  ROM: 1. Dorsiflexion: 10 degrees 2. Plantarflexion: 40 degrees 3. Inversion: 30 degrees 4. Eversion: 20 degrees  Tenderness to Palpation:  1. Lateral Malleolus: Negative 2. Medial Malleolus: Negative 3. Proximal Fibular Pain: Negative 4. Heel Pain: Negative 5. Cuboid: Negative 6. Navicular: Negative 7. Tibiotalar Joint: Positive pain central ankle tibiotalar joint. 8. Subtalar Joint: Negative 9. Posterior Recess: Negative  Tendon Pain: 1. Achilles: Positive pain and tenderness to palpation of the Achilles tendon with thickening noted. 2. Peroneals: Negative 3. Posterior Tibialis: Negative 4. Tibialis Anterior: Negative  Ligament Pain: 1. ATFL: Negative 2. CFL: Negative  3. PTFL: Negative 4. Deltoid Ligaments: Negative 5. Lis Franc Ligament: Negative  Stability:  1. Anterior Drawer: Negative 2. Posterior Drawer: Negative  Strength: 5/5 TA/GS/EHL  Pulses: 2+ DP/PT Pulses  Neuro: Intact motor and sensory  Additional Test: 1. Calcaneal Squeeze Test: Negative 2. Syndesmosis Squeeze Test: Negative [de-identified] : EXAM: [id number] - MR ANKLE LT  - ORDERED BY:  OLVIN REYES\par  \par  \par  PROCEDURE DATE:  03/16/2023\par  \par  \par  \par  INTERPRETATION:  LEFT ANKLE MRI\par  \par  CLINICAL INDICATION: Persistent ankle pain, status post Achilles tendon repair surgery in June 2022.\par  \par  COMPARISON: None available.\par  \par  TECHNIQUE: Multiplanar, multisequence MRI was obtained of the left ankle.\par  \par  FINDINGS:\par  \par  LIGAMENTS: Small chronic grade 2 sprain and healed minimally displaced cortical avulsion stress fracture at the fibular origin of the anterior talofibular ligament, but with intact fibers throughout. Calcaneofibular ligament is intact. . Grade 1 subacute on chronic sprain and fraying of the posterior inferior tibiofibular ligament without discrete tear. Chronic grade 2 sprains of the inferior tibiofibular ligaments without syndesmotic widening. Small grade 2 subacute on grade 2 chronic sprain of the deltoid ligamentous complex including more recent partial-thickness tear of the deep posterior tibiotalar band. 1 subacute on chronic sprain of the calcaneonavicular spring ligament, but with intact fibers throughout. Small grade 2 sprains of the bifurcated and short calcaneocuboid ligaments.\par  \par  DEEP POSTERIOR COMPARTMENT: Small subacute grade 2 sprain of the deep/lateral slip of the posterior tibialis tendon at the level of the accessory navicular (series 8, image 16; series 4, image 27). Grade 1 subacute on grade 2 chronic strain of the superficial/medial slip of the posterior tibialis tendon at the level of the naviculocuneiform joint (series 9, image 13). Subcutaneous grade 1 strain at the myotendinous junction of the flexor hallucis longus. Intact flexor digitorum longus.\par  \par  ACHILLES TENDON: Status post remote repair surgery with severe tendinosis and mild to moderate peritendinitis between the myotendinous junction of the soleus and avascular zone. Residual postsurgical changes versus small recurrent low-grade partial-thickness interstitial tear of the medial third the level of the avascular zone in its calcaneal insertion. Trace reactive fluid within the retrocalcaneal bursa without cesar bursitis. Subacute grade 1 strain at the myotendinous junction of the soleus.\par  \par  LATERAL COMPARTMENT: Peroneal tendons are intact.\par  \par  ANTERIOR COMPARTMENT: Tibialis anterior and extensor tendons are intact.\par  \par  OSSEOUS: No acute fracture, osteonecrosis, or aggressive neoplasm. No osteochondral lesion of the talar dome.\par  \par  SYNOVIUM: Small volume residual reactive tibiotalar and hindfoot effusions.\par  \par  GENERAL: No significant acute plantar fasciitis. No significant loss of normal fat signal within the sinus tarsus. Normal appearance of the neurovascular bundle within the tarsal tunnel.\par  \par  IMPRESSION:\par  \par  1.  Status post left Achilles repair surgery without high-grade residual or recurrent re-tear. Mild to moderate postsurgical tendinitis with low-grade residual versus small recurrent partial-thickness interstitial tear of the medial third between the avascular zone and calcaneal insertion.\par  2.  Grade 2 subacute on grade 2 chronic multi ligamentous sprain injury of left ankle and hindfoot including small more recent partial-thickness tears of the deltoid, bifurcate, and short plantar calcaneocuboid ligaments. Small residual reactive tibiotalar and hindfoot effusions. No recent fracture.\par  3.  Grade 2 subacute on grade 2 chronic strain of left posterior tibialis tendon including small more recent partial-thickness tear of the deep/lateral slip at the level of the accessory navicular. Grade 1 subacute on grade 2 chronic sprain of adjacent calcaneonavicular spring ligament.\par  \par  --- End of Report ---\par  \par  \par  \par  \par  \par  \par  ORQUIDEA YAN MD; Attending Radiologist\par  This document has been electronically signed. Mar 22 2023  9:03AM\par  \par   \par  \par   Ordered by: OLVIN REYES       Collected/Examined: 16Mar2023 12:51PM       \par  Verified by: OLVIN REYES 22Mar2023 12:31PM       \par   Result Communication: No patient communication needed at this time;\par  Stage: Final       \par   Performed at: [facility] [place]       Resulted: 22Mar2023 08:47AM       Last Updated: 22Mar2023 12:31PM       Accession: [id number]

## 2024-01-11 NOTE — DISCUSSION/SUMMARY
[de-identified] :  Today I had a lengthy discussion with the patient regarding their left ankle pain.I have addressed all the patient's concerns surrounding the pathology of their condition.  A lengthy discussion was had about the surgery. All risks, benefits and alternatives to the recommended surgical procedure were discussed which include but are not limited to bleeding, infection, nerve damage, vascular damage, failure of the wound to heal, the need for further surgery, loss of limb, DVT, PE, loss of life as well as the risks associated with general anesthesia. The patient verbalized understanding.  I recommended to the patient a lidocaine injection in her left ankle to assist in easing the chronic pain that she is experiencing.  I recommend that the patient utilize ice, NSAIDS PRN, and heat. They can also elevate their left ankle above the level of the heart.  The patient understood and verbally agreed to the treatment plan. All of their questions were answered and they were satisfied with the visit. The patient should call the office if they have any questions or experience worsening symptoms.

## 2024-01-11 NOTE — HISTORY OF PRESENT ILLNESS
[FreeTextEntry1] : 1/11/2024: The patient is a 43-year-old female who presents for follow-up evaluation of her left ankle pain. She is here for a referral as she previously saw Dr. Gallegos who recommended surgery which she does not want. She has an achilles tear which has led to her having tendon elongation which has cause debridement in her left ankle. She notes that she is experiencing chronic pain. She received a cortisone injection last visit which not did improve her symptoms and in fact worsened some of the pain that she was previously experiencing.  The patient presents to the office in sneakers and ambulating without assistance.  9/18/2023: The patient is a 43 year old female presenting for a follow-up evaluation of her left ankle pain. The patient states that she felt an increase in ankle pain after she received the cortisone injection during her last visit. She states that she continued to feel pain and discomfort since. She presents wearing sneakers and is ambulating without assistance. no other complaints.  7/20/23: The patient is here for follow-up of her left ankle. The patient is having central ankle pain.  Patient also having Achilles tendon pain but she is seeing a dermatologist who was injecting her Achilles tendon with cortisone injections.  Patient is here for her central ankle pain.  No other complaints.  No trauma associated with her pain.  Patient remains active.  3/24/23: The patient is here for a follow-up of her left ankle and to review the MRI of her left ankle.  No change since the previous office visit. Patient presents in sneakers. Patient states she is currently undergoing Physical therapy for left ankle.  3/1/23: The patient is a 43 yo female presenting for an initial visit of left foot/ankle pain. She is here seeking a second opinion. The patient states that she recently has Achilles tendon repair June 2022 by Dr. Broussard. She states a few weeks after she developed cellulitis and had a granuloma excision. She states she started physical therapy around the 6 week post op. The patient endorse posterior ankle tightness and and dorsal foot pain. The patient is currently in PT for this issue. She states that she has been compensating her gait patter to which her right knee is now bothering her. She denies numbness and tingling. She presents wearing boots and is ambulating without assistance. No other complaints. Had IUD prior to the injury. NO antibiotic

## 2024-03-26 PROBLEM — Z12.11 COLON CANCER SCREENING: Status: ACTIVE | Noted: 2024-03-26

## 2024-03-26 PROBLEM — Z12.4 CERVICAL CANCER SCREENING: Status: ACTIVE | Noted: 2024-03-26

## 2024-04-02 ENCOUNTER — RESULT CHARGE (OUTPATIENT)
Age: 44
End: 2024-04-02

## 2024-04-02 ENCOUNTER — APPOINTMENT (OUTPATIENT)
Dept: OBGYN | Facility: CLINIC | Age: 44
End: 2024-04-02
Payer: COMMERCIAL

## 2024-04-02 ENCOUNTER — APPOINTMENT (OUTPATIENT)
Dept: OBGYN | Facility: CLINIC | Age: 44
End: 2024-04-02

## 2024-04-02 VITALS
DIASTOLIC BLOOD PRESSURE: 79 MMHG | HEART RATE: 81 BPM | SYSTOLIC BLOOD PRESSURE: 120 MMHG | HEIGHT: 68 IN | WEIGHT: 143 LBS | BODY MASS INDEX: 21.67 KG/M2

## 2024-04-02 DIAGNOSIS — Z12.11 ENCOUNTER FOR SCREENING FOR MALIGNANT NEOPLASM OF COLON: ICD-10-CM

## 2024-04-02 DIAGNOSIS — R92.30 DENSE BREASTS, UNSPECIFIED: ICD-10-CM

## 2024-04-02 DIAGNOSIS — Z12.39 ENCOUNTER FOR OTHER SCREENING FOR MALIGNANT NEOPLASM OF BREAST: ICD-10-CM

## 2024-04-02 DIAGNOSIS — Z00.00 ENCOUNTER FOR GENERAL ADULT MEDICAL EXAMINATION W/OUT ABNORMAL FINDINGS: ICD-10-CM

## 2024-04-02 DIAGNOSIS — Z12.4 ENCOUNTER FOR SCREENING FOR MALIGNANT NEOPLASM OF CERVIX: ICD-10-CM

## 2024-04-02 LAB
BILIRUB UR QL STRIP: NEGATIVE
CLARITY UR: CLEAR
COLLECTION METHOD: NORMAL
DATE COLLECTED: NORMAL
GLUCOSE UR-MCNC: NEGATIVE
HCG UR QL: 0 EU/DL
HEMOCCULT SP1 STL QL: NEGATIVE
HEMOGLOBIN: 13.3
HGB UR QL STRIP.AUTO: NORMAL
KETONES UR-MCNC: NEGATIVE
LEUKOCYTE ESTERASE UR QL STRIP: NEGATIVE
NITRITE UR QL STRIP: NEGATIVE
PH UR STRIP: 7
PROT UR STRIP-MCNC: NEGATIVE
QUALITY CONTROL: YES
SP GR UR STRIP: 1

## 2024-04-02 PROCEDURE — 81003 URINALYSIS AUTO W/O SCOPE: CPT | Mod: QW

## 2024-04-02 PROCEDURE — 99396 PREV VISIT EST AGE 40-64: CPT

## 2024-04-02 PROCEDURE — 82270 OCCULT BLOOD FECES: CPT

## 2024-04-02 PROCEDURE — 85018 HEMOGLOBIN: CPT | Mod: QW

## 2024-04-02 NOTE — PHYSICAL EXAM
[Appropriately responsive] : appropriately responsive [Alert] : alert [No Acute Distress] : no acute distress [No Lymphadenopathy] : no lymphadenopathy [Soft] : soft [Non-tender] : non-tender [Non-distended] : non-distended [No HSM] : No HSM [No Lesions] : no lesions [No Mass] : no mass [Oriented x3] : oriented x3 [Examination Of The Breasts] : a normal appearance [No Masses] : no breast masses were palpable [Labia Majora] : normal [Labia Minora] : normal [Normal] : normal [Uterine Adnexae] : normal [Normal rectal exam] : was normal [Occult Blood Positive] : was negative for occult blood analysis [FreeTextEntry5] : IUD string not visualized.

## 2024-04-02 NOTE — HISTORY OF PRESENT ILLNESS
[TextBox_4] : 43 year old female presents for her annual visit. Denies GYN complaints at this time. Mirena IUD placed 01/2022, tolerating well. She ruptured her Achilles 2 years ago and needs to have surgery in the near future to have it repaired as it did not heal properly the first time.

## 2024-04-02 NOTE — DISCUSSION/SUMMARY
[FreeTextEntry1] : she has 3 children. 2 girls and a boy.  she ruptured her achilles while working with a . the  never reached out post injury or post operatively. PT told her she needed a new pair of shoes post injury.

## 2024-04-02 NOTE — PLAN
[FreeTextEntry1] : Patient to follow up in 1 year for annual GYN exam Mammogram and bilateral breast US due: 05/2024, rx given Colonoscopy due: she had an appt but had to reschedule s/s her child +covid. she is going to re-schedule after her achilles sx. she is to discuss with GI regarding hemorrhoidectomy at time of cpy consult.  Bone density due: PM Pap ordered Hemoccult ordered All questions answered, patient is agreeable with plan.

## 2024-04-08 LAB — CYTOLOGY CVX/VAG DOC THIN PREP: NORMAL

## 2024-04-18 ENCOUNTER — NON-APPOINTMENT (OUTPATIENT)
Age: 44
End: 2024-04-18

## 2024-04-18 ENCOUNTER — APPOINTMENT (OUTPATIENT)
Dept: ORTHOPEDIC SURGERY | Facility: CLINIC | Age: 44
End: 2024-04-18
Payer: COMMERCIAL

## 2024-04-18 ENCOUNTER — APPOINTMENT (OUTPATIENT)
Dept: MRI IMAGING | Facility: CLINIC | Age: 44
End: 2024-04-18

## 2024-04-18 DIAGNOSIS — M25.851 OTHER SPECIFIED JOINT DISORDERS, RIGHT HIP: ICD-10-CM

## 2024-04-18 DIAGNOSIS — M22.41 CHONDROMALACIA PATELLAE, RIGHT KNEE: ICD-10-CM

## 2024-04-18 PROCEDURE — 73503 X-RAY EXAM HIP UNI 4/> VIEWS: CPT | Mod: 26,RT

## 2024-04-18 PROCEDURE — 99214 OFFICE O/P EST MOD 30 MIN: CPT

## 2024-04-18 NOTE — PHYSICAL EXAM
[de-identified] : Physical Exam:  General: Well appearing, no acute distress  Neurologic: A&Ox3, No focal deficits  Head: NCAT without abrasions, lacerations, or ecchymosis to head, face, or scalp  Eyes: No scleral icterus, no gross abnormalities  Respiratory: Equal chest wall expansion bilaterally, no accessory muscle use  Lymphatic: No lymphadenopathy palpated  Skin: Warm and dry  Psychiatric: Normal mood and affect  Examination of the Right hip reveals no obvious deformity or leg length discrepancy. There is no swelling noted. The patient is nontender to palpation over the greater trochanter, groin, and IT band. The patients range of motion is to 110 degrees of hip flexion, 40 degrees of abduction, 20 degrees of adduction, 15 degrees of internal rotation and 35 degrees of external rotation. The patient has 5/5 strength to resisted hip flexion, abduction and adduction. The patient has a negative HAIDER and positive FADIR Test. Positve Mccann Test. Positive resisted SLR test at 30 degrees of hip flexion ([facility]). Negative Piriformis Test. No SI joint instability. There is no pain with logrolling. The calf and thigh are soft and nontender bilaterally. The patient is grossly neurovascularly intact distally.  The right knee demonstrates range of motion from 0 to 130 degrees.  No pain with Gretchen or grind testing.  Very mild lateral joint line tenderness and lateral facet tenderness.  No pain with patella mobility or apprehension testing. [de-identified] : 2 views of R hip were performed today and available for me to review. Results were discussed with the patient. They demonstrate no f/x, dislocation or other deformity.  These show evidence of mild hip impingement as well as what appears to be a calcification within the labrum on the right hip.  3 views of the pelvis were performed today and available for me to review. Results were discussed with the patient. They demonstrate no f/x, dislocation or other deformity.

## 2024-04-18 NOTE — DISCUSSION/SUMMARY
[de-identified] : I had a lengthy discussion with the patient regarding their current condition. We discussed the treatment options including operative and nonoperative management. At this time I recommended an MRI of her right hip due to the calcification on imaging as well as her failure to improve with PT in the past.  She is referred back to physical therapy in the interim.  She will follow-up after the MRI results are available.  All questions were answered.

## 2024-04-18 NOTE — HISTORY OF PRESENT ILLNESS
[de-identified] : DAYLIN RAPP is a 43 year female being seen for f/u visit R knee pain. She is scheduled for upcoming surgery on her left achilles and is concerned about her ability to recover due to her long-standing right knee pain. She reports her pain has been worsening over the past week. She denies new injury to her right knee. She reports most pain with bending. She endorses pain over the anteromedial aspect of her knee. She reports she has been going to [employer] class and feeling weakness in her right knee. Patient denies mechanical symptoms such as clicking and popping, as well as instances of instability.  She denies any specific pain in her hip on a daily basis.  She attends PT and [facility] in the past.

## 2025-05-16 ENCOUNTER — NON-APPOINTMENT (OUTPATIENT)
Age: 45
End: 2025-05-16